# Patient Record
Sex: FEMALE | Race: OTHER | NOT HISPANIC OR LATINO
[De-identification: names, ages, dates, MRNs, and addresses within clinical notes are randomized per-mention and may not be internally consistent; named-entity substitution may affect disease eponyms.]

---

## 2018-03-27 ENCOUNTER — RESULT REVIEW (OUTPATIENT)
Age: 28
End: 2018-03-27

## 2018-03-28 ENCOUNTER — APPOINTMENT (OUTPATIENT)
Dept: OBGYN | Facility: CLINIC | Age: 28
End: 2018-03-28
Payer: COMMERCIAL

## 2018-03-28 PROBLEM — Z00.00 ENCOUNTER FOR PREVENTIVE HEALTH EXAMINATION: Status: ACTIVE | Noted: 2018-03-28

## 2018-03-28 PROCEDURE — 36415 COLL VENOUS BLD VENIPUNCTURE: CPT

## 2018-03-28 PROCEDURE — 76817 TRANSVAGINAL US OBSTETRIC: CPT

## 2018-03-28 PROCEDURE — 99204 OFFICE O/P NEW MOD 45 MIN: CPT | Mod: 25

## 2018-04-09 ENCOUNTER — APPOINTMENT (OUTPATIENT)
Dept: OBGYN | Facility: CLINIC | Age: 28
End: 2018-04-09
Payer: COMMERCIAL

## 2018-04-09 PROCEDURE — 0500F INITIAL PRENATAL CARE VISIT: CPT

## 2018-04-09 PROCEDURE — 76817 TRANSVAGINAL US OBSTETRIC: CPT

## 2018-04-24 ENCOUNTER — APPOINTMENT (OUTPATIENT)
Dept: OBGYN | Facility: CLINIC | Age: 28
End: 2018-04-24
Payer: COMMERCIAL

## 2018-04-24 PROCEDURE — 76813 OB US NUCHAL MEAS 1 GEST: CPT

## 2018-04-24 PROCEDURE — 0502F SUBSEQUENT PRENATAL CARE: CPT

## 2018-04-24 PROCEDURE — 36415 COLL VENOUS BLD VENIPUNCTURE: CPT

## 2018-05-02 ENCOUNTER — TRANSCRIPTION ENCOUNTER (OUTPATIENT)
Age: 28
End: 2018-05-02

## 2018-05-23 ENCOUNTER — APPOINTMENT (OUTPATIENT)
Dept: OBGYN | Facility: CLINIC | Age: 28
End: 2018-05-23
Payer: COMMERCIAL

## 2018-05-23 PROCEDURE — 36415 COLL VENOUS BLD VENIPUNCTURE: CPT

## 2018-05-23 PROCEDURE — 0502F SUBSEQUENT PRENATAL CARE: CPT

## 2018-06-19 ENCOUNTER — APPOINTMENT (OUTPATIENT)
Dept: OBGYN | Facility: CLINIC | Age: 28
End: 2018-06-19
Payer: COMMERCIAL

## 2018-06-19 PROCEDURE — 0502F SUBSEQUENT PRENATAL CARE: CPT

## 2018-06-20 ENCOUNTER — ASOB RESULT (OUTPATIENT)
Age: 28
End: 2018-06-20

## 2018-06-20 ENCOUNTER — APPOINTMENT (OUTPATIENT)
Dept: ANTEPARTUM | Facility: CLINIC | Age: 28
End: 2018-06-20
Payer: COMMERCIAL

## 2018-06-20 PROCEDURE — 76811 OB US DETAILED SNGL FETUS: CPT

## 2018-06-20 PROCEDURE — 76817 TRANSVAGINAL US OBSTETRIC: CPT | Mod: 59

## 2018-07-15 ENCOUNTER — OUTPATIENT (OUTPATIENT)
Dept: OUTPATIENT SERVICES | Facility: HOSPITAL | Age: 28
LOS: 1 days | End: 2018-07-15
Payer: SELF-PAY

## 2018-07-15 DIAGNOSIS — O26.899 OTHER SPECIFIED PREGNANCY RELATED CONDITIONS, UNSPECIFIED TRIMESTER: ICD-10-CM

## 2018-07-15 DIAGNOSIS — Z3A.00 WEEKS OF GESTATION OF PREGNANCY NOT SPECIFIED: ICD-10-CM

## 2018-07-15 LAB
APPEARANCE UR: ABNORMAL
BACTERIA # UR AUTO: ABNORMAL /HPF
BILIRUB UR-MCNC: NEGATIVE — SIGNIFICANT CHANGE UP
COLOR SPEC: YELLOW — SIGNIFICANT CHANGE UP
DIFF PNL FLD: NEGATIVE — SIGNIFICANT CHANGE UP
EPI CELLS # UR: SIGNIFICANT CHANGE UP /HPF
GLUCOSE UR QL: NEGATIVE — SIGNIFICANT CHANGE UP
KETONES UR-MCNC: ABNORMAL
LEUKOCYTE ESTERASE UR-ACNC: ABNORMAL
NITRITE UR-MCNC: NEGATIVE — SIGNIFICANT CHANGE UP
PH UR: 7 — SIGNIFICANT CHANGE UP (ref 5–8)
PROT UR-MCNC: 30 MG/DL
RBC CASTS # UR COMP ASSIST: ABNORMAL /HPF (ref 0–2)
SP GR SPEC: 1.02 — SIGNIFICANT CHANGE UP (ref 1.01–1.02)
UROBILINOGEN FLD QL: NEGATIVE — SIGNIFICANT CHANGE UP
WBC UR QL: SIGNIFICANT CHANGE UP /HPF (ref 0–5)

## 2018-07-15 PROCEDURE — G0463: CPT

## 2018-07-15 PROCEDURE — 81001 URINALYSIS AUTO W/SCOPE: CPT

## 2018-07-15 PROCEDURE — 87086 URINE CULTURE/COLONY COUNT: CPT

## 2018-07-15 PROCEDURE — 59025 FETAL NON-STRESS TEST: CPT

## 2018-07-15 RX ORDER — ACETAMINOPHEN 500 MG
975 TABLET ORAL ONCE
Qty: 0 | Refills: 0 | Status: COMPLETED | OUTPATIENT
Start: 2018-07-15 | End: 2018-07-15

## 2018-07-15 RX ADMIN — Medication 975 MILLIGRAM(S): at 18:17

## 2018-07-16 LAB
CULTURE RESULTS: SIGNIFICANT CHANGE UP
SPECIMEN SOURCE: SIGNIFICANT CHANGE UP

## 2018-07-19 ENCOUNTER — APPOINTMENT (OUTPATIENT)
Dept: OBGYN | Facility: CLINIC | Age: 28
End: 2018-07-19
Payer: COMMERCIAL

## 2018-07-19 PROCEDURE — 0502F SUBSEQUENT PRENATAL CARE: CPT

## 2018-08-17 ENCOUNTER — APPOINTMENT (OUTPATIENT)
Dept: OBGYN | Facility: CLINIC | Age: 28
End: 2018-08-17
Payer: COMMERCIAL

## 2018-08-17 PROCEDURE — 90471 IMMUNIZATION ADMIN: CPT

## 2018-08-17 PROCEDURE — 90715 TDAP VACCINE 7 YRS/> IM: CPT

## 2018-08-17 PROCEDURE — 0502F SUBSEQUENT PRENATAL CARE: CPT

## 2018-08-17 PROCEDURE — 96372 THER/PROPH/DIAG INJ SC/IM: CPT

## 2018-08-29 ENCOUNTER — APPOINTMENT (OUTPATIENT)
Dept: ANTEPARTUM | Facility: CLINIC | Age: 28
End: 2018-08-29
Payer: COMMERCIAL

## 2018-08-29 ENCOUNTER — ASOB RESULT (OUTPATIENT)
Age: 28
End: 2018-08-29

## 2018-08-29 PROCEDURE — 76816 OB US FOLLOW-UP PER FETUS: CPT

## 2018-09-14 ENCOUNTER — APPOINTMENT (OUTPATIENT)
Dept: OBGYN | Facility: CLINIC | Age: 28
End: 2018-09-14
Payer: COMMERCIAL

## 2018-09-14 PROCEDURE — 0502F SUBSEQUENT PRENATAL CARE: CPT

## 2018-09-25 ENCOUNTER — APPOINTMENT (OUTPATIENT)
Dept: OBGYN | Facility: CLINIC | Age: 28
End: 2018-09-25
Payer: COMMERCIAL

## 2018-09-25 PROCEDURE — 0502F SUBSEQUENT PRENATAL CARE: CPT

## 2018-10-12 ENCOUNTER — APPOINTMENT (OUTPATIENT)
Dept: OBGYN | Facility: CLINIC | Age: 28
End: 2018-10-12
Payer: COMMERCIAL

## 2018-10-12 PROCEDURE — 90471 IMMUNIZATION ADMIN: CPT

## 2018-10-12 PROCEDURE — 0502F SUBSEQUENT PRENATAL CARE: CPT

## 2018-10-12 PROCEDURE — 90686 IIV4 VACC NO PRSV 0.5 ML IM: CPT

## 2018-10-16 ENCOUNTER — APPOINTMENT (OUTPATIENT)
Dept: OBGYN | Facility: CLINIC | Age: 28
End: 2018-10-16
Payer: COMMERCIAL

## 2018-10-16 PROCEDURE — 0502F SUBSEQUENT PRENATAL CARE: CPT

## 2018-10-22 ENCOUNTER — INPATIENT (INPATIENT)
Facility: HOSPITAL | Age: 28
LOS: 1 days | Discharge: HOME | End: 2018-10-24
Attending: OBSTETRICS & GYNECOLOGY | Admitting: OBSTETRICS & GYNECOLOGY
Payer: COMMERCIAL

## 2018-10-22 VITALS — WEIGHT: 178.57 LBS | HEIGHT: 63 IN

## 2018-10-22 DIAGNOSIS — Z3A.00 WEEKS OF GESTATION OF PREGNANCY NOT SPECIFIED: ICD-10-CM

## 2018-10-22 DIAGNOSIS — O26.899 OTHER SPECIFIED PREGNANCY RELATED CONDITIONS, UNSPECIFIED TRIMESTER: ICD-10-CM

## 2018-10-22 DIAGNOSIS — Z34.80 ENCOUNTER FOR SUPERVISION OF OTHER NORMAL PREGNANCY, UNSPECIFIED TRIMESTER: ICD-10-CM

## 2018-10-22 LAB
BASOPHILS # BLD AUTO: 0 K/UL — SIGNIFICANT CHANGE UP (ref 0–0.2)
BASOPHILS NFR BLD AUTO: 0.3 % — SIGNIFICANT CHANGE UP (ref 0–2)
BLD GP AB SCN SERPL QL: NEGATIVE — SIGNIFICANT CHANGE UP
EOSINOPHIL # BLD AUTO: 0 K/UL — SIGNIFICANT CHANGE UP (ref 0–0.5)
EOSINOPHIL NFR BLD AUTO: 0.3 % — SIGNIFICANT CHANGE UP (ref 0–6)
HCT VFR BLD CALC: 38.8 % — SIGNIFICANT CHANGE UP (ref 34.5–45)
HGB BLD-MCNC: 13.7 G/DL — SIGNIFICANT CHANGE UP (ref 11.5–15.5)
LYMPHOCYTES # BLD AUTO: 16.6 % — SIGNIFICANT CHANGE UP (ref 13–44)
LYMPHOCYTES # BLD AUTO: 2.3 K/UL — SIGNIFICANT CHANGE UP (ref 1–3.3)
MCHC RBC-ENTMCNC: 28.7 PG — SIGNIFICANT CHANGE UP (ref 27–34)
MCHC RBC-ENTMCNC: 35.2 GM/DL — SIGNIFICANT CHANGE UP (ref 32–36)
MCV RBC AUTO: 81.4 FL — SIGNIFICANT CHANGE UP (ref 80–100)
MONOCYTES # BLD AUTO: 0.5 K/UL — SIGNIFICANT CHANGE UP (ref 0–0.9)
MONOCYTES NFR BLD AUTO: 3.7 % — SIGNIFICANT CHANGE UP (ref 2–14)
NEUTROPHILS # BLD AUTO: 10.8 K/UL — HIGH (ref 1.8–7.4)
NEUTROPHILS NFR BLD AUTO: 79.1 % — HIGH (ref 43–77)
PLATELET # BLD AUTO: 257 K/UL — SIGNIFICANT CHANGE UP (ref 150–400)
RBC # BLD: 4.77 M/UL — SIGNIFICANT CHANGE UP (ref 3.8–5.2)
RBC # FLD: 13.1 % — SIGNIFICANT CHANGE UP (ref 10.3–14.5)
RH IG SCN BLD-IMP: NEGATIVE — SIGNIFICANT CHANGE UP
RH IG SCN BLD-IMP: NEGATIVE — SIGNIFICANT CHANGE UP
T PALLIDUM AB TITR SER: NEGATIVE — SIGNIFICANT CHANGE UP
WBC # BLD: 13.7 K/UL — HIGH (ref 3.8–10.5)
WBC # FLD AUTO: 13.7 K/UL — HIGH (ref 3.8–10.5)

## 2018-10-22 PROCEDURE — 59400 OBSTETRICAL CARE: CPT

## 2018-10-22 RX ORDER — DIPHENHYDRAMINE HCL 50 MG
25 CAPSULE ORAL EVERY 6 HOURS
Qty: 0 | Refills: 0 | Status: DISCONTINUED | OUTPATIENT
Start: 2018-10-22 | End: 2018-10-24

## 2018-10-22 RX ORDER — SODIUM CHLORIDE 9 MG/ML
3 INJECTION INTRAMUSCULAR; INTRAVENOUS; SUBCUTANEOUS EVERY 8 HOURS
Qty: 0 | Refills: 0 | Status: DISCONTINUED | OUTPATIENT
Start: 2018-10-22 | End: 2018-10-24

## 2018-10-22 RX ORDER — OXYTOCIN 10 UNIT/ML
41.67 VIAL (ML) INJECTION
Qty: 20 | Refills: 0 | Status: DISCONTINUED | OUTPATIENT
Start: 2018-10-22 | End: 2018-10-24

## 2018-10-22 RX ORDER — CITRIC ACID/SODIUM CITRATE 300-500 MG
15 SOLUTION, ORAL ORAL EVERY 4 HOURS
Qty: 0 | Refills: 0 | Status: DISCONTINUED | OUTPATIENT
Start: 2018-10-22 | End: 2018-10-22

## 2018-10-22 RX ORDER — CEFAZOLIN SODIUM 1 G
1000 VIAL (EA) INJECTION ONCE
Qty: 0 | Refills: 0 | Status: DISCONTINUED | OUTPATIENT
Start: 2018-10-22 | End: 2018-10-22

## 2018-10-22 RX ORDER — PRAMOXINE HYDROCHLORIDE 150 MG/15G
1 AEROSOL, FOAM RECTAL EVERY 4 HOURS
Qty: 0 | Refills: 0 | Status: DISCONTINUED | OUTPATIENT
Start: 2018-10-22 | End: 2018-10-22

## 2018-10-22 RX ORDER — OXYTOCIN 10 UNIT/ML
41.67 VIAL (ML) INJECTION
Qty: 20 | Refills: 0 | Status: DISCONTINUED | OUTPATIENT
Start: 2018-10-22 | End: 2018-10-22

## 2018-10-22 RX ORDER — MAGNESIUM HYDROXIDE 400 MG/1
30 TABLET, CHEWABLE ORAL
Qty: 0 | Refills: 0 | Status: DISCONTINUED | OUTPATIENT
Start: 2018-10-22 | End: 2018-10-24

## 2018-10-22 RX ORDER — AER TRAVELER 0.5 G/1
1 SOLUTION RECTAL; TOPICAL EVERY 4 HOURS
Qty: 0 | Refills: 0 | Status: DISCONTINUED | OUTPATIENT
Start: 2018-10-22 | End: 2018-10-24

## 2018-10-22 RX ORDER — SODIUM CHLORIDE 9 MG/ML
1000 INJECTION, SOLUTION INTRAVENOUS
Qty: 0 | Refills: 0 | Status: DISCONTINUED | OUTPATIENT
Start: 2018-10-22 | End: 2018-10-22

## 2018-10-22 RX ORDER — OXYTOCIN 10 UNIT/ML
333.33 VIAL (ML) INJECTION
Qty: 20 | Refills: 0 | Status: COMPLETED | OUTPATIENT
Start: 2018-10-22

## 2018-10-22 RX ORDER — OXYTOCIN 10 UNIT/ML
333.33 VIAL (ML) INJECTION
Qty: 20 | Refills: 0 | Status: DISCONTINUED | OUTPATIENT
Start: 2018-10-22 | End: 2018-10-22

## 2018-10-22 RX ORDER — CEFAZOLIN SODIUM 1 G
2000 VIAL (EA) INJECTION ONCE
Qty: 0 | Refills: 0 | Status: COMPLETED | OUTPATIENT
Start: 2018-10-22 | End: 2018-10-22

## 2018-10-22 RX ORDER — HYDROCORTISONE 1 %
1 OINTMENT (GRAM) TOPICAL EVERY 4 HOURS
Qty: 0 | Refills: 0 | Status: DISCONTINUED | OUTPATIENT
Start: 2018-10-22 | End: 2018-10-24

## 2018-10-22 RX ORDER — DIBUCAINE 1 %
1 OINTMENT (GRAM) RECTAL EVERY 4 HOURS
Qty: 0 | Refills: 0 | Status: DISCONTINUED | OUTPATIENT
Start: 2018-10-22 | End: 2018-10-22

## 2018-10-22 RX ORDER — TETANUS TOXOID, REDUCED DIPHTHERIA TOXOID AND ACELLULAR PERTUSSIS VACCINE, ADSORBED 5; 2.5; 8; 8; 2.5 [IU]/.5ML; [IU]/.5ML; UG/.5ML; UG/.5ML; UG/.5ML
0.5 SUSPENSION INTRAMUSCULAR ONCE
Qty: 0 | Refills: 0 | Status: DISCONTINUED | OUTPATIENT
Start: 2018-10-22 | End: 2018-10-24

## 2018-10-22 RX ORDER — LANOLIN
1 OINTMENT (GRAM) TOPICAL EVERY 6 HOURS
Qty: 0 | Refills: 0 | Status: DISCONTINUED | OUTPATIENT
Start: 2018-10-22 | End: 2018-10-24

## 2018-10-22 RX ORDER — IBUPROFEN 200 MG
600 TABLET ORAL EVERY 6 HOURS
Qty: 0 | Refills: 0 | Status: COMPLETED | OUTPATIENT
Start: 2018-10-22 | End: 2019-09-20

## 2018-10-22 RX ORDER — HYDROCORTISONE 1 %
1 OINTMENT (GRAM) TOPICAL EVERY 4 HOURS
Qty: 0 | Refills: 0 | Status: DISCONTINUED | OUTPATIENT
Start: 2018-10-22 | End: 2018-10-22

## 2018-10-22 RX ORDER — ACETAMINOPHEN 500 MG
975 TABLET ORAL EVERY 6 HOURS
Qty: 0 | Refills: 0 | Status: COMPLETED | OUTPATIENT
Start: 2018-10-22 | End: 2019-09-20

## 2018-10-22 RX ORDER — SODIUM CHLORIDE 9 MG/ML
3 INJECTION INTRAMUSCULAR; INTRAVENOUS; SUBCUTANEOUS EVERY 8 HOURS
Qty: 0 | Refills: 0 | Status: DISCONTINUED | OUTPATIENT
Start: 2018-10-22 | End: 2018-10-22

## 2018-10-22 RX ORDER — SIMETHICONE 80 MG/1
80 TABLET, CHEWABLE ORAL EVERY 6 HOURS
Qty: 0 | Refills: 0 | Status: DISCONTINUED | OUTPATIENT
Start: 2018-10-22 | End: 2018-10-24

## 2018-10-22 RX ORDER — DIBUCAINE 1 %
1 OINTMENT (GRAM) RECTAL EVERY 4 HOURS
Qty: 0 | Refills: 0 | Status: DISCONTINUED | OUTPATIENT
Start: 2018-10-22 | End: 2018-10-24

## 2018-10-22 RX ORDER — PRAMOXINE HYDROCHLORIDE 150 MG/15G
1 AEROSOL, FOAM RECTAL EVERY 4 HOURS
Qty: 0 | Refills: 0 | Status: DISCONTINUED | OUTPATIENT
Start: 2018-10-22 | End: 2018-10-24

## 2018-10-22 RX ORDER — DOCUSATE SODIUM 100 MG
100 CAPSULE ORAL
Qty: 0 | Refills: 0 | Status: DISCONTINUED | OUTPATIENT
Start: 2018-10-22 | End: 2018-10-24

## 2018-10-22 RX ORDER — ACETAMINOPHEN 500 MG
975 TABLET ORAL EVERY 6 HOURS
Qty: 0 | Refills: 0 | Status: DISCONTINUED | OUTPATIENT
Start: 2018-10-22 | End: 2018-10-24

## 2018-10-22 RX ORDER — OXYCODONE HYDROCHLORIDE 5 MG/1
5 TABLET ORAL
Qty: 0 | Refills: 0 | Status: DISCONTINUED | OUTPATIENT
Start: 2018-10-22 | End: 2018-10-24

## 2018-10-22 RX ORDER — AER TRAVELER 0.5 G/1
1 SOLUTION RECTAL; TOPICAL EVERY 4 HOURS
Qty: 0 | Refills: 0 | Status: DISCONTINUED | OUTPATIENT
Start: 2018-10-22 | End: 2018-10-22

## 2018-10-22 RX ORDER — IBUPROFEN 200 MG
600 TABLET ORAL EVERY 6 HOURS
Qty: 0 | Refills: 0 | Status: DISCONTINUED | OUTPATIENT
Start: 2018-10-22 | End: 2018-10-24

## 2018-10-22 RX ORDER — OXYCODONE HYDROCHLORIDE 5 MG/1
5 TABLET ORAL EVERY 4 HOURS
Qty: 0 | Refills: 0 | Status: DISCONTINUED | OUTPATIENT
Start: 2018-10-22 | End: 2018-10-24

## 2018-10-22 RX ORDER — SODIUM CHLORIDE 9 MG/ML
1000 INJECTION, SOLUTION INTRAVENOUS ONCE
Qty: 0 | Refills: 0 | Status: COMPLETED | OUTPATIENT
Start: 2018-10-22 | End: 2018-10-22

## 2018-10-22 RX ORDER — KETOROLAC TROMETHAMINE 30 MG/ML
30 SYRINGE (ML) INJECTION ONCE
Qty: 0 | Refills: 0 | Status: DISCONTINUED | OUTPATIENT
Start: 2018-10-22 | End: 2018-10-22

## 2018-10-22 RX ORDER — GLYCERIN ADULT
1 SUPPOSITORY, RECTAL RECTAL AT BEDTIME
Qty: 0 | Refills: 0 | Status: DISCONTINUED | OUTPATIENT
Start: 2018-10-22 | End: 2018-10-24

## 2018-10-22 RX ADMIN — Medication 100 MILLIGRAM(S): at 15:20

## 2018-10-22 RX ADMIN — Medication 30 MILLIGRAM(S): at 14:50

## 2018-10-22 RX ADMIN — Medication 30 MILLIGRAM(S): at 15:20

## 2018-10-22 RX ADMIN — SODIUM CHLORIDE 2000 MILLILITER(S): 9 INJECTION, SOLUTION INTRAVENOUS at 09:02

## 2018-10-22 RX ADMIN — Medication 0.25 MILLIGRAM(S): at 09:20

## 2018-10-23 LAB
HCT VFR BLD CALC: 33.2 % — LOW (ref 34.5–45)
HGB BLD-MCNC: 11.1 G/DL — LOW (ref 11.5–15.5)
KLEIHAUER-BETKE CALCULATION: 0 % — SIGNIFICANT CHANGE UP (ref 0–0.3)

## 2018-10-23 RX ADMIN — Medication 1 TABLET(S): at 11:00

## 2018-10-23 RX ADMIN — Medication 600 MILLIGRAM(S): at 02:03

## 2018-10-23 RX ADMIN — Medication 975 MILLIGRAM(S): at 13:07

## 2018-10-23 RX ADMIN — Medication 975 MILLIGRAM(S): at 21:45

## 2018-10-23 RX ADMIN — Medication 600 MILLIGRAM(S): at 02:40

## 2018-10-23 RX ADMIN — Medication 600 MILLIGRAM(S): at 07:57

## 2018-10-23 RX ADMIN — Medication 975 MILLIGRAM(S): at 14:00

## 2018-10-23 RX ADMIN — Medication 975 MILLIGRAM(S): at 21:00

## 2018-10-23 RX ADMIN — Medication 600 MILLIGRAM(S): at 19:15

## 2018-10-23 RX ADMIN — Medication 600 MILLIGRAM(S): at 18:40

## 2018-10-23 RX ADMIN — Medication 600 MILLIGRAM(S): at 08:50

## 2018-10-23 NOTE — PROGRESS NOTE ADULT - SUBJECTIVE AND OBJECTIVE BOX
Postpartum Note- PPD#1    Prenatal Labs  Blood type: O Negative  Rubella IgG: non-immune  RPR: Negative        S:Patient c/o perineal discomfort. Pain is otherwise controlled.    Pt is OOB, tolerating PO, voiding. Lochia WNL.     O:  Vital Signs Last 24 Hrs  T(C): 36.8 (23 Oct 2018 05:00), Max: 37.1 (22 Oct 2018 14:05)  T(F): 98.2 (23 Oct 2018 05:00), Max: 98.8 (22 Oct 2018 14:05)  HR: 85 (23 Oct 2018 05:00) (78 - 90)  BP: 115/70 (23 Oct 2018 05:00) (104/67 - 126/70)  BP(mean): 82 (22 Oct 2018 14:35) (80 - 82)  RR: 18 (23 Oct 2018 05:00) (16 - 20)  SpO2: 99% (22 Oct 2018 15:50) (99% - 100%)     Gen: NAD  Abdomen: Soft, nontender, non-distended, fundus firm.  Vaginal: Lochia WNL,    Ext:  Neg calf tenderness    LABS:    Hemoglobin: 13.7 g/dL (10-22 @ 09:35)      Hematocrit: 38.8 % (10-22 @ 09:35)

## 2018-10-23 NOTE — LACTATION INITIAL EVALUATION - INTERVENTION OUTCOME
verbalizes understanding/demonstrates understanding of teaching/needs met/good return demonstration/Ifnat latched well with bursts of sucking and swallows but had to be limited due to high bilirubin level.

## 2018-10-23 NOTE — LACTATION INITIAL EVALUATION - LACTATION INTERVENTIONS
Offer one breast for up to 15 minutes at each feeding then offer bottle with EHM/formula then pump. Until infant is medically cleared to BF on demand./initiate hand expression routine/initiate dual electric pump routine

## 2018-10-23 NOTE — LACTATION INITIAL EVALUATION - SUCK/SWALLOW
short bursts/limited to 15 minutes only per MD due to the need for continuation of phototherapy/sustained/swallows

## 2018-10-23 NOTE — PROGRESS NOTE ADULT - ASSESSMENT
A/P:  27y  PPD # 1   S/P   with first degree laceration, manual removal of placenta  Doing Well    PMHx:  Current Issues: RH neg, for MMR A/P:  27y  PPD # 1   S/P   with first degree laceration, manual removal of placenta  Doing Well    PMHx:  Current Issues: RH neg, baby B pos, check KB, for rhogam, for MMR

## 2018-10-24 ENCOUNTER — TRANSCRIPTION ENCOUNTER (OUTPATIENT)
Age: 28
End: 2018-10-24

## 2018-10-24 VITALS
SYSTOLIC BLOOD PRESSURE: 114 MMHG | DIASTOLIC BLOOD PRESSURE: 75 MMHG | HEART RATE: 66 BPM | TEMPERATURE: 98 F | RESPIRATION RATE: 18 BRPM

## 2018-10-24 PROCEDURE — 85018 HEMOGLOBIN: CPT

## 2018-10-24 PROCEDURE — 86900 BLOOD TYPING SEROLOGIC ABO: CPT

## 2018-10-24 PROCEDURE — 59025 FETAL NON-STRESS TEST: CPT

## 2018-10-24 PROCEDURE — 59050 FETAL MONITOR W/REPORT: CPT

## 2018-10-24 PROCEDURE — G0463: CPT

## 2018-10-24 PROCEDURE — 85027 COMPLETE CBC AUTOMATED: CPT

## 2018-10-24 PROCEDURE — 86780 TREPONEMA PALLIDUM: CPT

## 2018-10-24 PROCEDURE — 85460 HEMOGLOBIN FETAL: CPT

## 2018-10-24 PROCEDURE — 86901 BLOOD TYPING SEROLOGIC RH(D): CPT

## 2018-10-24 PROCEDURE — 86850 RBC ANTIBODY SCREEN: CPT

## 2018-10-24 PROCEDURE — 85014 HEMATOCRIT: CPT

## 2018-10-24 RX ORDER — ACETAMINOPHEN 500 MG
3 TABLET ORAL
Qty: 0 | Refills: 0 | DISCHARGE
Start: 2018-10-24

## 2018-10-24 RX ORDER — IBUPROFEN 200 MG
1 TABLET ORAL
Qty: 0 | Refills: 0 | DISCHARGE
Start: 2018-10-24

## 2018-10-24 RX ADMIN — Medication 600 MILLIGRAM(S): at 00:18

## 2018-10-24 RX ADMIN — Medication 600 MILLIGRAM(S): at 00:55

## 2018-10-24 RX ADMIN — Medication 975 MILLIGRAM(S): at 06:05

## 2018-10-24 RX ADMIN — Medication 975 MILLIGRAM(S): at 06:45

## 2018-10-24 NOTE — DISCHARGE NOTE OB - PATIENT PORTAL LINK FT
You can access the [facility] Patient Portal, offered by [facility], by registering with the following website: [email or web address]

## 2018-10-24 NOTE — DISCHARGE NOTE OB - MATERIALS PROVIDED
Breastfeeding Log/[facility] Hearing Screen Program/[facility] [place] Screening Program/Birth Certificate Instructions/Back To Sleep Handout/Shaken Baby Prevention Handout/Breastfeeding Guide and Packet/Breastfeeding Mother’s Support Group Information/Guide to Postpartum Care

## 2018-10-24 NOTE — PROGRESS NOTE ADULT - SUBJECTIVE AND OBJECTIVE BOX
PPD#2- ATTENDING NOTE    S: Patient doing well. Minimal lochia. Pain controlled.    O: Vital Signs Last 24 Hrs  T(C): 36.9 (24 Oct 2018 05:00), Max: 36.9 (24 Oct 2018 05:00)  T(F): 98.4 (24 Oct 2018 05:00), Max: 98.4 (24 Oct 2018 05:00)  HR: 66 (24 Oct 2018 05:00) (66 - 66)  BP: 114/75 (24 Oct 2018 05:00) (113/74 - 114/75)  BP(mean): --  RR: 18 (24 Oct 2018 05:00) (18 - 18)  SpO2: --    Gen: NAD  Abd: soft, NT, ND, fundus firm below umbilicus  Lochia: moderate  Ext: no tenderness, no hyper reflexia    Labs:                        11.1   x     )-----------( x        ( 23 Oct 2018 08:11 )             33.2

## 2018-10-24 NOTE — DISCHARGE NOTE OB - CARE PROVIDER_API CALL
Irlanda Banda (DO), Obstetrics and Gynecology  [address] [place] [id number]  Phone: [phone]  Fax: [phone]

## 2018-10-24 NOTE — DISCHARGE NOTE OB - CARE PLAN
Principal Discharge DX:	Normal vaginal delivery  Goal:	Return to normal activity  Assessment and plan of treatment:	regular diet; activity as tolerated; Appt 6 weeks- please call

## 2018-10-24 NOTE — DISCHARGE NOTE OB - MEDICATION SUMMARY - MEDICATIONS TO TAKE
I will START or STAY ON the medications listed below when I get home from the hospital:    ibuprofen 600 mg oral tablet  -- 1 tab(s) by mouth every 6 hours  -- Indication: For Normal vaginal delivery    acetaminophen 325 mg oral tablet  -- 3 tab(s) by mouth every 6 hours  -- Indication: For Normal vaginal delivery

## 2018-10-25 ENCOUNTER — APPOINTMENT (OUTPATIENT)
Dept: OBGYN | Facility: CLINIC | Age: 28
End: 2018-10-25

## 2018-11-01 ENCOUNTER — APPOINTMENT (OUTPATIENT)
Dept: OBGYN | Facility: CLINIC | Age: 28
End: 2018-11-01

## 2018-12-03 ENCOUNTER — APPOINTMENT (OUTPATIENT)
Dept: OBGYN | Facility: CLINIC | Age: 28
End: 2018-12-03
Payer: COMMERCIAL

## 2018-12-03 PROCEDURE — 0503F POSTPARTUM CARE VISIT: CPT

## 2019-06-04 ENCOUNTER — APPOINTMENT (OUTPATIENT)
Dept: OBGYN | Facility: CLINIC | Age: 29
End: 2019-06-04
Payer: COMMERCIAL

## 2019-06-04 PROCEDURE — 99213 OFFICE O/P EST LOW 20 MIN: CPT | Mod: 25

## 2019-06-04 PROCEDURE — 76817 TRANSVAGINAL US OBSTETRIC: CPT

## 2019-06-04 PROCEDURE — 36415 COLL VENOUS BLD VENIPUNCTURE: CPT

## 2019-06-25 ENCOUNTER — APPOINTMENT (OUTPATIENT)
Dept: OBGYN | Facility: CLINIC | Age: 29
End: 2019-06-25

## 2019-07-08 ENCOUNTER — APPOINTMENT (OUTPATIENT)
Dept: OBGYN | Facility: CLINIC | Age: 29
End: 2019-07-08
Payer: COMMERCIAL

## 2019-07-08 PROCEDURE — 36415 COLL VENOUS BLD VENIPUNCTURE: CPT

## 2019-07-08 PROCEDURE — 76801 OB US < 14 WKS SINGLE FETUS: CPT | Mod: 59

## 2019-07-08 PROCEDURE — 76813 OB US NUCHAL MEAS 1 GEST: CPT

## 2019-07-08 PROCEDURE — 0500F INITIAL PRENATAL CARE VISIT: CPT

## 2019-08-05 ENCOUNTER — APPOINTMENT (OUTPATIENT)
Dept: OBGYN | Facility: CLINIC | Age: 29
End: 2019-08-05
Payer: COMMERCIAL

## 2019-08-05 PROCEDURE — 0502F SUBSEQUENT PRENATAL CARE: CPT

## 2019-08-05 PROCEDURE — 36415 COLL VENOUS BLD VENIPUNCTURE: CPT

## 2019-08-28 ENCOUNTER — TRANSCRIPTION ENCOUNTER (OUTPATIENT)
Age: 29
End: 2019-08-28

## 2019-08-29 ENCOUNTER — APPOINTMENT (OUTPATIENT)
Dept: ANTEPARTUM | Facility: CLINIC | Age: 29
End: 2019-08-29
Payer: COMMERCIAL

## 2019-08-29 ENCOUNTER — ASOB RESULT (OUTPATIENT)
Age: 29
End: 2019-08-29

## 2019-08-29 PROCEDURE — 76811 OB US DETAILED SNGL FETUS: CPT

## 2019-08-29 PROCEDURE — 76817 TRANSVAGINAL US OBSTETRIC: CPT | Mod: 59

## 2019-09-04 ENCOUNTER — APPOINTMENT (OUTPATIENT)
Dept: OBGYN | Facility: CLINIC | Age: 29
End: 2019-09-04

## 2019-10-02 ENCOUNTER — APPOINTMENT (OUTPATIENT)
Dept: OBGYN | Facility: CLINIC | Age: 29
End: 2019-10-02
Payer: COMMERCIAL

## 2019-10-02 PROCEDURE — 0502F SUBSEQUENT PRENATAL CARE: CPT

## 2019-10-23 ENCOUNTER — APPOINTMENT (OUTPATIENT)
Dept: OBGYN | Facility: CLINIC | Age: 29
End: 2019-10-23
Payer: COMMERCIAL

## 2019-10-23 PROCEDURE — 96372 THER/PROPH/DIAG INJ SC/IM: CPT

## 2019-10-23 PROCEDURE — 0502F SUBSEQUENT PRENATAL CARE: CPT

## 2019-10-23 PROCEDURE — 36415 COLL VENOUS BLD VENIPUNCTURE: CPT

## 2019-11-11 ENCOUNTER — APPOINTMENT (OUTPATIENT)
Dept: OBGYN | Facility: CLINIC | Age: 29
End: 2019-11-11
Payer: COMMERCIAL

## 2019-11-11 PROCEDURE — 0502F SUBSEQUENT PRENATAL CARE: CPT

## 2019-11-12 ENCOUNTER — APPOINTMENT (OUTPATIENT)
Dept: OBGYN | Facility: CLINIC | Age: 29
End: 2019-11-12

## 2019-11-26 ENCOUNTER — APPOINTMENT (OUTPATIENT)
Dept: OBGYN | Facility: CLINIC | Age: 29
End: 2019-11-26
Payer: COMMERCIAL

## 2019-11-26 PROCEDURE — 0502F SUBSEQUENT PRENATAL CARE: CPT

## 2019-11-26 PROCEDURE — 76818 FETAL BIOPHYS PROFILE W/NST: CPT

## 2019-11-26 PROCEDURE — 76816 OB US FOLLOW-UP PER FETUS: CPT

## 2019-12-10 ENCOUNTER — APPOINTMENT (OUTPATIENT)
Dept: OBGYN | Facility: CLINIC | Age: 29
End: 2019-12-10
Payer: COMMERCIAL

## 2019-12-10 PROCEDURE — 0502F SUBSEQUENT PRENATAL CARE: CPT

## 2019-12-10 PROCEDURE — 76818 FETAL BIOPHYS PROFILE W/NST: CPT

## 2019-12-10 PROCEDURE — 90471 IMMUNIZATION ADMIN: CPT

## 2019-12-10 PROCEDURE — 90715 TDAP VACCINE 7 YRS/> IM: CPT

## 2019-12-26 ENCOUNTER — APPOINTMENT (OUTPATIENT)
Dept: OBGYN | Facility: CLINIC | Age: 29
End: 2019-12-26
Payer: COMMERCIAL

## 2019-12-26 PROCEDURE — 76816 OB US FOLLOW-UP PER FETUS: CPT

## 2019-12-26 PROCEDURE — 0502F SUBSEQUENT PRENATAL CARE: CPT

## 2019-12-27 ENCOUNTER — APPOINTMENT (OUTPATIENT)
Dept: ANTEPARTUM | Facility: CLINIC | Age: 29
End: 2019-12-27

## 2020-01-02 ENCOUNTER — APPOINTMENT (OUTPATIENT)
Dept: OBGYN | Facility: CLINIC | Age: 30
End: 2020-01-02
Payer: COMMERCIAL

## 2020-01-02 PROCEDURE — 0502F SUBSEQUENT PRENATAL CARE: CPT

## 2020-01-06 ENCOUNTER — APPOINTMENT (OUTPATIENT)
Dept: OBGYN | Facility: CLINIC | Age: 30
End: 2020-01-06
Payer: COMMERCIAL

## 2020-01-06 PROCEDURE — 0502F SUBSEQUENT PRENATAL CARE: CPT

## 2020-01-08 ENCOUNTER — TRANSCRIPTION ENCOUNTER (OUTPATIENT)
Age: 30
End: 2020-01-08

## 2020-01-09 ENCOUNTER — INPATIENT (INPATIENT)
Facility: HOSPITAL | Age: 30
LOS: 1 days | Discharge: HOME | End: 2020-01-11
Attending: OBSTETRICS & GYNECOLOGY | Admitting: OBSTETRICS & GYNECOLOGY
Payer: COMMERCIAL

## 2020-01-09 VITALS
TEMPERATURE: 98 F | RESPIRATION RATE: 18 BRPM | HEART RATE: 85 BPM | SYSTOLIC BLOOD PRESSURE: 123 MMHG | OXYGEN SATURATION: 100 % | DIASTOLIC BLOOD PRESSURE: 54 MMHG

## 2020-01-09 DIAGNOSIS — O26.899 OTHER SPECIFIED PREGNANCY RELATED CONDITIONS, UNSPECIFIED TRIMESTER: ICD-10-CM

## 2020-01-09 DIAGNOSIS — Z34.80 ENCOUNTER FOR SUPERVISION OF OTHER NORMAL PREGNANCY, UNSPECIFIED TRIMESTER: ICD-10-CM

## 2020-01-09 DIAGNOSIS — Z3A.00 WEEKS OF GESTATION OF PREGNANCY NOT SPECIFIED: ICD-10-CM

## 2020-01-09 LAB
BASOPHILS # BLD AUTO: 0.04 K/UL — SIGNIFICANT CHANGE UP (ref 0–0.2)
BASOPHILS NFR BLD AUTO: 0.4 % — SIGNIFICANT CHANGE UP (ref 0–2)
BLD GP AB SCN SERPL QL: POSITIVE — SIGNIFICANT CHANGE UP
EOSINOPHIL # BLD AUTO: 0.05 K/UL — SIGNIFICANT CHANGE UP (ref 0–0.5)
EOSINOPHIL NFR BLD AUTO: 0.5 % — SIGNIFICANT CHANGE UP (ref 0–6)
HCT VFR BLD CALC: 38.2 % — SIGNIFICANT CHANGE UP (ref 34.5–45)
HGB BLD-MCNC: 12.7 G/DL — SIGNIFICANT CHANGE UP (ref 11.5–15.5)
IMM GRANULOCYTES NFR BLD AUTO: 0.8 % — SIGNIFICANT CHANGE UP (ref 0–1.5)
LYMPHOCYTES # BLD AUTO: 2.04 K/UL — SIGNIFICANT CHANGE UP (ref 1–3.3)
LYMPHOCYTES # BLD AUTO: 20.8 % — SIGNIFICANT CHANGE UP (ref 13–44)
MCHC RBC-ENTMCNC: 26.4 PG — LOW (ref 27–34)
MCHC RBC-ENTMCNC: 33.2 GM/DL — SIGNIFICANT CHANGE UP (ref 32–36)
MCV RBC AUTO: 79.4 FL — LOW (ref 80–100)
MONOCYTES # BLD AUTO: 0.59 K/UL — SIGNIFICANT CHANGE UP (ref 0–0.9)
MONOCYTES NFR BLD AUTO: 6 % — SIGNIFICANT CHANGE UP (ref 2–14)
NEUTROPHILS # BLD AUTO: 7.03 K/UL — SIGNIFICANT CHANGE UP (ref 1.8–7.4)
NEUTROPHILS NFR BLD AUTO: 71.5 % — SIGNIFICANT CHANGE UP (ref 43–77)
NRBC # BLD: 0 /100 WBCS — SIGNIFICANT CHANGE UP (ref 0–0)
PLATELET # BLD AUTO: 272 K/UL — SIGNIFICANT CHANGE UP (ref 150–400)
RBC # BLD: 4.81 M/UL — SIGNIFICANT CHANGE UP (ref 3.8–5.2)
RBC # FLD: 14.7 % — HIGH (ref 10.3–14.5)
RH IG SCN BLD-IMP: NEGATIVE — SIGNIFICANT CHANGE UP
WBC # BLD: 9.83 K/UL — SIGNIFICANT CHANGE UP (ref 3.8–10.5)
WBC # FLD AUTO: 9.83 K/UL — SIGNIFICANT CHANGE UP (ref 3.8–10.5)

## 2020-01-09 RX ORDER — OXYCODONE HYDROCHLORIDE 5 MG/1
5 TABLET ORAL ONCE
Refills: 0 | Status: DISCONTINUED | OUTPATIENT
Start: 2020-01-09 | End: 2020-01-11

## 2020-01-09 RX ORDER — SODIUM CHLORIDE 9 MG/ML
1000 INJECTION, SOLUTION INTRAVENOUS
Refills: 0 | Status: DISCONTINUED | OUTPATIENT
Start: 2020-01-09 | End: 2020-01-09

## 2020-01-09 RX ORDER — OXYTOCIN 10 UNIT/ML
4 VIAL (ML) INJECTION
Qty: 30 | Refills: 0 | Status: DISCONTINUED | OUTPATIENT
Start: 2020-01-09 | End: 2020-01-11

## 2020-01-09 RX ORDER — OXYCODONE HYDROCHLORIDE 5 MG/1
5 TABLET ORAL
Refills: 0 | Status: DISCONTINUED | OUTPATIENT
Start: 2020-01-09 | End: 2020-01-11

## 2020-01-09 RX ORDER — KETOROLAC TROMETHAMINE 30 MG/ML
30 SYRINGE (ML) INJECTION ONCE
Refills: 0 | Status: DISCONTINUED | OUTPATIENT
Start: 2020-01-09 | End: 2020-01-09

## 2020-01-09 RX ORDER — GLYCERIN ADULT
1 SUPPOSITORY, RECTAL RECTAL AT BEDTIME
Refills: 0 | Status: DISCONTINUED | OUTPATIENT
Start: 2020-01-09 | End: 2020-01-11

## 2020-01-09 RX ORDER — AER TRAVELER 0.5 G/1
1 SOLUTION RECTAL; TOPICAL EVERY 4 HOURS
Refills: 0 | Status: DISCONTINUED | OUTPATIENT
Start: 2020-01-09 | End: 2020-01-11

## 2020-01-09 RX ORDER — LANOLIN
1 OINTMENT (GRAM) TOPICAL EVERY 6 HOURS
Refills: 0 | Status: DISCONTINUED | OUTPATIENT
Start: 2020-01-09 | End: 2020-01-11

## 2020-01-09 RX ORDER — OXYTOCIN 10 UNIT/ML
333.33 VIAL (ML) INJECTION
Qty: 20 | Refills: 0 | Status: DISCONTINUED | OUTPATIENT
Start: 2020-01-09 | End: 2020-01-11

## 2020-01-09 RX ORDER — DIPHENHYDRAMINE HCL 50 MG
25 CAPSULE ORAL EVERY 6 HOURS
Refills: 0 | Status: DISCONTINUED | OUTPATIENT
Start: 2020-01-09 | End: 2020-01-11

## 2020-01-09 RX ORDER — IBUPROFEN 200 MG
600 TABLET ORAL EVERY 6 HOURS
Refills: 0 | Status: COMPLETED | OUTPATIENT
Start: 2020-01-09 | End: 2020-12-07

## 2020-01-09 RX ORDER — SODIUM CHLORIDE 9 MG/ML
3 INJECTION INTRAMUSCULAR; INTRAVENOUS; SUBCUTANEOUS EVERY 8 HOURS
Refills: 0 | Status: DISCONTINUED | OUTPATIENT
Start: 2020-01-09 | End: 2020-01-11

## 2020-01-09 RX ORDER — TETANUS TOXOID, REDUCED DIPHTHERIA TOXOID AND ACELLULAR PERTUSSIS VACCINE, ADSORBED 5; 2.5; 8; 8; 2.5 [IU]/.5ML; [IU]/.5ML; UG/.5ML; UG/.5ML; UG/.5ML
0.5 SUSPENSION INTRAMUSCULAR ONCE
Refills: 0 | Status: DISCONTINUED | OUTPATIENT
Start: 2020-01-09 | End: 2020-01-11

## 2020-01-09 RX ORDER — SIMETHICONE 80 MG/1
80 TABLET, CHEWABLE ORAL EVERY 4 HOURS
Refills: 0 | Status: DISCONTINUED | OUTPATIENT
Start: 2020-01-09 | End: 2020-01-11

## 2020-01-09 RX ORDER — ACETAMINOPHEN 500 MG
975 TABLET ORAL
Refills: 0 | Status: DISCONTINUED | OUTPATIENT
Start: 2020-01-09 | End: 2020-01-11

## 2020-01-09 RX ORDER — CITRIC ACID/SODIUM CITRATE 300-500 MG
15 SOLUTION, ORAL ORAL EVERY 6 HOURS
Refills: 0 | Status: DISCONTINUED | OUTPATIENT
Start: 2020-01-09 | End: 2020-01-09

## 2020-01-09 RX ORDER — BENZOCAINE 10 %
1 GEL (GRAM) MUCOUS MEMBRANE EVERY 6 HOURS
Refills: 0 | Status: DISCONTINUED | OUTPATIENT
Start: 2020-01-09 | End: 2020-01-11

## 2020-01-09 RX ORDER — HYDROCORTISONE 1 %
1 OINTMENT (GRAM) TOPICAL EVERY 6 HOURS
Refills: 0 | Status: DISCONTINUED | OUTPATIENT
Start: 2020-01-09 | End: 2020-01-11

## 2020-01-09 RX ORDER — PRAMOXINE HYDROCHLORIDE 150 MG/15G
1 AEROSOL, FOAM RECTAL EVERY 4 HOURS
Refills: 0 | Status: DISCONTINUED | OUTPATIENT
Start: 2020-01-09 | End: 2020-01-11

## 2020-01-09 RX ORDER — MAGNESIUM HYDROXIDE 400 MG/1
30 TABLET, CHEWABLE ORAL
Refills: 0 | Status: DISCONTINUED | OUTPATIENT
Start: 2020-01-09 | End: 2020-01-11

## 2020-01-09 RX ORDER — DIBUCAINE 1 %
1 OINTMENT (GRAM) RECTAL EVERY 6 HOURS
Refills: 0 | Status: DISCONTINUED | OUTPATIENT
Start: 2020-01-09 | End: 2020-01-11

## 2020-01-09 RX ADMIN — Medication 30 MILLIGRAM(S): at 23:42

## 2020-01-09 NOTE — PRE-ANESTHESIA EVALUATION ADULT - NSANTHOSAYNRD_GEN_A_CORE
No. NOHELIA screening performed.  STOP BANG Legend: 0-2 = LOW Risk; 3-4 = INTERMEDIATE Risk; 5-8 = HIGH Risk

## 2020-01-10 LAB
HCT VFR BLD CALC: 35.4 % — SIGNIFICANT CHANGE UP (ref 34.5–45)
HGB BLD-MCNC: 11.6 G/DL — SIGNIFICANT CHANGE UP (ref 11.5–15.5)
T PALLIDUM AB TITR SER: NEGATIVE — SIGNIFICANT CHANGE UP

## 2020-01-10 PROCEDURE — 59400 OBSTETRICAL CARE: CPT

## 2020-01-10 PROCEDURE — 86077 PHYS BLOOD BANK SERV XMATCH: CPT

## 2020-01-10 RX ORDER — IBUPROFEN 200 MG
600 TABLET ORAL EVERY 6 HOURS
Refills: 0 | Status: DISCONTINUED | OUTPATIENT
Start: 2020-01-10 | End: 2020-01-11

## 2020-01-10 RX ADMIN — Medication 975 MILLIGRAM(S): at 03:17

## 2020-01-10 RX ADMIN — Medication 975 MILLIGRAM(S): at 07:35

## 2020-01-10 RX ADMIN — Medication 975 MILLIGRAM(S): at 14:14

## 2020-01-10 RX ADMIN — Medication 1 TABLET(S): at 11:25

## 2020-01-10 RX ADMIN — Medication 975 MILLIGRAM(S): at 08:30

## 2020-01-10 RX ADMIN — Medication 600 MILLIGRAM(S): at 18:23

## 2020-01-10 RX ADMIN — Medication 600 MILLIGRAM(S): at 12:30

## 2020-01-10 RX ADMIN — Medication 975 MILLIGRAM(S): at 15:30

## 2020-01-10 RX ADMIN — Medication 975 MILLIGRAM(S): at 22:00

## 2020-01-10 RX ADMIN — Medication 600 MILLIGRAM(S): at 05:41

## 2020-01-10 RX ADMIN — SODIUM CHLORIDE 3 MILLILITER(S): 9 INJECTION INTRAMUSCULAR; INTRAVENOUS; SUBCUTANEOUS at 05:43

## 2020-01-10 RX ADMIN — Medication 600 MILLIGRAM(S): at 06:44

## 2020-01-10 RX ADMIN — Medication 975 MILLIGRAM(S): at 21:00

## 2020-01-10 RX ADMIN — Medication 975 MILLIGRAM(S): at 02:40

## 2020-01-10 RX ADMIN — Medication 600 MILLIGRAM(S): at 11:26

## 2020-01-10 NOTE — PROGRESS NOTE ADULT - PROBLEM SELECTOR PLAN 1
Increase OOB  Regular diet  PO Pain protocol  AM H&H  Rubella non immune- will order mmr  RH neg - will order rhogam if baby rh +  Routine Postpartum Care    Chastity Crisostomo PA-C Increase OOB  Regular diet  PO Pain protocol  AM H&H  Rubella non immune- will order mmr  RH neg - baby rh neg  Routine Postpartum Care    Chastity Crisostomo PA-C

## 2020-01-10 NOTE — PROGRESS NOTE ADULT - SUBJECTIVE AND OBJECTIVE BOX
Postpartum Note- PPD#1    Allergies    amoxicillin (Rash)    Intolerances    Blood Type O   Negative    RPR : Negative    Rubella: Non - Immune      S: Patient is a  29y     P      PPD#1         S/P    Patient w/o complaints, pain is controlled.    Pt is OOB, tolerating PO, passing flatus. Lochia WNL.     Feeding: Breast    O:  Vital Signs Last 24 Hrs  T(C): 36.7 (10 Aleksandar 2020 05:27), Max: 36.9 (10 Aleksandar 2020 01:40)  T(F): 98 (10 Aleksandar 2020 05:27), Max: 98.5 (10 Aleksandar 2020 01:40)  HR: 79 (10 Aleksandar 2020 05:27) (74 - 88)  BP: 99/60 (10 Aleksandar 2020 05:27) (99/60 - 124/58)  BP(mean): 85 (10 Aleksandar 2020 00:15) (78 - 85)  RR: 18 (10 Aleksandar 2020 05:27) (18 - 18)  SpO2: 99% (10 Aleksandar 2020 01:40) (98% - 100%)     Gen: NAD  Abdomen: Soft, nontender, non-distended, fundus firm.  Vaginal: Lochia WN  Ext:  Neg calf tenderness    LABS:    Hemoglobin: 11.6 g/dL (01-10 @ 06:21)  Hemoglobin: 12.7 g/dL ( @ 19:34)      Hematocrit: 35.4 % (01-10 @ 06:21)  Hematocrit: 38.2 % ( @ 19:34) Postpartum Note- PPD#1    Allergies    amoxicillin (Rash)    Intolerances    Blood Type O   Negative    RPR : Negative    Rubella: Non - Immune      S: Patient is a  29y     P 2003     PPD#1         S/P  VAVD  Patient w/o complaints, pain is controlled.    Pt is OOB, tolerating PO, passing flatus. Lochia WNL.     Feeding: Breast    O:  Vital Signs Last 24 Hrs  T(C): 36.7 (10 Aleksandar 2020 05:27), Max: 36.9 (10 Aleksandar 2020 01:40)  T(F): 98 (10 Aleksandar 2020 05:27), Max: 98.5 (10 Aleksandar 2020 01:40)  HR: 79 (10 Aleksandar 2020 05:27) (74 - 88)  BP: 99/60 (10 Aleksandar 2020 05:27) (99/60 - 124/58)  BP(mean): 85 (10 Aleksandar 2020 00:15) (78 - 85)  RR: 18 (10 Aleksandar 2020 05:27) (18 - 18)  SpO2: 99% (10 Aleksandar 2020 01:40) (98% - 100%)     Gen: NAD  Abdomen: Soft, nontender, non-distended, fundus firm.  Vaginal: Lochia WN  Ext:  Neg calf tenderness    LABS:    Hemoglobin: 11.6 g/dL (01-10 @ 06:21)  Hemoglobin: 12.7 g/dL (01-09 @ 19:34)      Hematocrit: 35.4 % (01-10 @ 06:21)  Hematocrit: 38.2 % (01-09 @ 19:34)

## 2020-01-10 NOTE — PROGRESS NOTE ADULT - ATTENDING COMMENTS
pt seen and examined. doing well today. borderline urine output. pt advised to increase hydration. will continue to measure. IV fluid bolus if output doesn't improve. am h/h 11.6/35.4. VSS. exam benign. routine pp care. ambulation encouraged.     EDD [place]

## 2020-01-10 NOTE — LACTATION INITIAL EVALUATION - LACTATION INTERVENTIONS
initiate hand expression routine/initiate dual electric pump routine Pumping guidelines, pump kit care, pump log, LC contact info, pumping; key points for success./initiate hand expression routine/initiate dual electric pump routine

## 2020-01-10 NOTE — PROGRESS NOTE ADULT - ASSESSMENT
A/P:  29y  PPD # 1      S/P            doing well    PMHx: none  Current Issues: none A/P:  29y  PPD # 1      S/P     VAVD       doing well    PMHx: none  Current Issues: none

## 2020-01-10 NOTE — ANESTHESIA FOLLOW-UP NOTE - NSEVALATIONFT_GEN_ALL_CORE
Pt denies HA, N/V numbness, tingling. Pt states tolerating PO intake and ambulating without difficulty.

## 2020-01-11 ENCOUNTER — TRANSCRIPTION ENCOUNTER (OUTPATIENT)
Age: 30
End: 2020-01-11

## 2020-01-11 VITALS
DIASTOLIC BLOOD PRESSURE: 67 MMHG | TEMPERATURE: 98 F | RESPIRATION RATE: 18 BRPM | OXYGEN SATURATION: 99 % | HEART RATE: 67 BPM | SYSTOLIC BLOOD PRESSURE: 100 MMHG

## 2020-01-11 PROCEDURE — 59050 FETAL MONITOR W/REPORT: CPT

## 2020-01-11 PROCEDURE — 86850 RBC ANTIBODY SCREEN: CPT

## 2020-01-11 PROCEDURE — 86901 BLOOD TYPING SEROLOGIC RH(D): CPT

## 2020-01-11 PROCEDURE — 85027 COMPLETE CBC AUTOMATED: CPT

## 2020-01-11 PROCEDURE — 86870 RBC ANTIBODY IDENTIFICATION: CPT

## 2020-01-11 PROCEDURE — 59025 FETAL NON-STRESS TEST: CPT

## 2020-01-11 PROCEDURE — 90707 MMR VACCINE SC: CPT

## 2020-01-11 PROCEDURE — 85018 HEMOGLOBIN: CPT

## 2020-01-11 PROCEDURE — 85014 HEMATOCRIT: CPT

## 2020-01-11 PROCEDURE — G0463: CPT

## 2020-01-11 PROCEDURE — 86780 TREPONEMA PALLIDUM: CPT

## 2020-01-11 PROCEDURE — 86900 BLOOD TYPING SEROLOGIC ABO: CPT

## 2020-01-11 RX ADMIN — Medication 600 MILLIGRAM(S): at 07:42

## 2020-01-11 RX ADMIN — Medication 975 MILLIGRAM(S): at 05:55

## 2020-01-11 RX ADMIN — Medication 975 MILLIGRAM(S): at 12:30

## 2020-01-11 RX ADMIN — Medication 975 MILLIGRAM(S): at 04:57

## 2020-01-11 RX ADMIN — Medication 600 MILLIGRAM(S): at 03:05

## 2020-01-11 RX ADMIN — Medication 975 MILLIGRAM(S): at 11:40

## 2020-01-11 RX ADMIN — Medication 1 TABLET(S): at 11:39

## 2020-01-11 RX ADMIN — Medication 0.5 MILLILITER(S): at 09:24

## 2020-01-11 RX ADMIN — Medication 600 MILLIGRAM(S): at 02:06

## 2020-01-11 RX ADMIN — Medication 600 MILLIGRAM(S): at 08:20

## 2020-01-11 NOTE — DISCHARGE NOTE OB - CARE PROVIDER_API CALL
Nitza East)  Obstetrics and Gynecology  [address] [place] [address], [address]  [place] [address]  Phone: [phone]  Fax: [phone]  Follow Up Time:

## 2020-01-11 NOTE — PROGRESS NOTE ADULT - PROBLEM SELECTOR PLAN 1
routine pp care  ambulation encouraged  baby in NICU under lights and uncleared for discharged  reviewed pp precautions   stable for dc  f/u in six weeks or sooner prn

## 2020-01-11 NOTE — PROGRESS NOTE ADULT - SUBJECTIVE AND OBJECTIVE BOX
S: Patient doing well. Minimal lochia. Pain controlled.    O: Vital Signs Last 24 Hrs  T(C): 36.5 (11 Jan 2020 05:00), Max: 36.7 (10 Aleksandar 2020 18:53)  T(F): 97.7 (11 Jan 2020 05:00), Max: 98 (10 Aleksandar 2020 18:53)  HR: 67 (11 Jan 2020 05:00) (67 - 70)  BP: 100/67 (11 Jan 2020 05:00) (100/67 - 117/79)  BP(mean): --  RR: 18 (11 Jan 2020 05:00) (18 - 18)  SpO2: 99% (11 Jan 2020 05:00) (99% - 100%)    Gen: NAD  Abd: soft, NT, ND, fundus firm below umbilicus  Lochia: moderate  Ext: no tenderness    Labs:                        11.6   x     )-----------( x        ( 10 Aleksandar 2020 06:21 )             35.4

## 2020-01-11 NOTE — DISCHARGE NOTE OB - CARE PROVIDERS DIRECT ADDRESSES
[email or web address]

## 2020-01-11 NOTE — DISCHARGE NOTE OB - PATIENT PORTAL LINK FT
You can access the FollowMyHealth Patient Portal offered by [facility] by registering at the following website: [email or web address]. By joining [employer]’s FollowMyHealth portal, you will also be able to view your health information using other applications (apps) compatible with our system.

## 2020-01-11 NOTE — DISCHARGE NOTE OB - HOSPITAL COURSE
The patient was admitted to labor and delivery and had a . Rh negative, baby Rh negative. Problem: Vaginal delivery.  Plan: routine pp care  ambulation encouraged  baby in NICU under lights and uncleared for discharged  stable for dc  f/u in six weeks or sooner prn.

## 2020-01-11 NOTE — DISCHARGE NOTE OB - MEDICATION SUMMARY - MEDICATIONS TO TAKE
I will START or STAY ON the medications listed below when I get home from the hospital:    Prenatal Multivitamins with Folic Acid 1 mg oral tablet  -- 1 tab(s) by mouth once a day  -- Indication: For Vaginal delivery

## 2020-01-13 ENCOUNTER — APPOINTMENT (OUTPATIENT)
Dept: OBGYN | Facility: CLINIC | Age: 30
End: 2020-01-13

## 2020-01-17 ENCOUNTER — APPOINTMENT (OUTPATIENT)
Dept: OBGYN | Facility: CLINIC | Age: 30
End: 2020-01-17

## 2020-01-28 ENCOUNTER — APPOINTMENT (OUTPATIENT)
Dept: OBGYN | Facility: CLINIC | Age: 30
End: 2020-01-28

## 2021-04-15 NOTE — ANESTHESIA FOLLOW-UP NOTE - NSEVALATION_GEN_ALL_CORE
No apparent complications or complaints regarding anesthesia care at this time/All questions were answered Administered By (Optional): Kimberly Christianson CNP

## 2024-06-20 ENCOUNTER — APPOINTMENT (OUTPATIENT)
Dept: UROLOGY | Facility: CLINIC | Age: 34
End: 2024-06-20